# Patient Record
Sex: MALE | Race: WHITE | NOT HISPANIC OR LATINO | URBAN - METROPOLITAN AREA
[De-identification: names, ages, dates, MRNs, and addresses within clinical notes are randomized per-mention and may not be internally consistent; named-entity substitution may affect disease eponyms.]

---

## 2018-03-11 ENCOUNTER — EMERGENCY (EMERGENCY)
Facility: HOSPITAL | Age: 83
LOS: 0 days | Discharge: HOME | End: 2018-03-11
Attending: EMERGENCY MEDICINE | Admitting: EMERGENCY MEDICINE
Payer: MEDICARE

## 2018-03-11 VITALS
OXYGEN SATURATION: 95 % | DIASTOLIC BLOOD PRESSURE: 79 MMHG | TEMPERATURE: 98 F | HEART RATE: 68 BPM | RESPIRATION RATE: 17 BRPM | SYSTOLIC BLOOD PRESSURE: 126 MMHG

## 2018-03-11 VITALS
DIASTOLIC BLOOD PRESSURE: 72 MMHG | RESPIRATION RATE: 18 BRPM | HEART RATE: 55 BPM | HEIGHT: 66 IN | SYSTOLIC BLOOD PRESSURE: 127 MMHG | WEIGHT: 169.98 LBS

## 2018-03-11 DIAGNOSIS — G89.29 OTHER CHRONIC PAIN: ICD-10-CM

## 2018-03-11 DIAGNOSIS — M54.9 DORSALGIA, UNSPECIFIED: ICD-10-CM
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
## 2018-03-11 DIAGNOSIS — Z91.81 HISTORY OF FALLING: ICD-10-CM

## 2018-03-11 DIAGNOSIS — F10.129 ALCOHOL ABUSE WITH INTOXICATION, UNSPECIFIED: ICD-10-CM

## 2018-03-11 LAB
ALBUMIN SERPL ELPH-MCNC: 3.6 G/DL — SIGNIFICANT CHANGE UP (ref 3.3–5)
ALP SERPL-CCNC: 96 U/L — SIGNIFICANT CHANGE UP (ref 40–120)
ALT FLD-CCNC: 28 U/L — SIGNIFICANT CHANGE UP (ref 12–78)
ANION GAP SERPL CALC-SCNC: 12 MMOL/L — SIGNIFICANT CHANGE UP (ref 5–17)
APAP SERPL-MCNC: 7 UG/ML — LOW (ref 10–30)
APTT BLD: 25.8 SEC — LOW (ref 27.5–37.4)
AST SERPL-CCNC: 34 U/L — SIGNIFICANT CHANGE UP (ref 15–37)
BASOPHILS # BLD AUTO: 0.05 K/UL — SIGNIFICANT CHANGE UP (ref 0–0.2)
BASOPHILS NFR BLD AUTO: 0.6 % — SIGNIFICANT CHANGE UP (ref 0–2)
BILIRUB SERPL-MCNC: 0.2 MG/DL — SIGNIFICANT CHANGE UP (ref 0.2–1.2)
BUN SERPL-MCNC: 33 MG/DL — HIGH (ref 7–23)
CALCIUM SERPL-MCNC: 8.3 MG/DL — LOW (ref 8.5–10.1)
CHLORIDE SERPL-SCNC: 108 MMOL/L — SIGNIFICANT CHANGE UP (ref 96–108)
CK SERPL-CCNC: 210 U/L — SIGNIFICANT CHANGE UP (ref 26–308)
CO2 SERPL-SCNC: 21 MMOL/L — LOW (ref 22–31)
CREAT SERPL-MCNC: 1.27 MG/DL — SIGNIFICANT CHANGE UP (ref 0.5–1.3)
EOSINOPHIL # BLD AUTO: 0.16 K/UL — SIGNIFICANT CHANGE UP (ref 0–0.5)
EOSINOPHIL NFR BLD AUTO: 1.8 % — SIGNIFICANT CHANGE UP (ref 0–6)
ETHANOL SERPL-MCNC: 412 MG/DL — HIGH (ref 0–10)
GLUCOSE SERPL-MCNC: 106 MG/DL — HIGH (ref 70–99)
HCT VFR BLD CALC: 39.5 % — SIGNIFICANT CHANGE UP (ref 39–50)
HGB BLD-MCNC: 13.2 G/DL — SIGNIFICANT CHANGE UP (ref 13–17)
IMM GRANULOCYTES NFR BLD AUTO: 0.3 % — SIGNIFICANT CHANGE UP (ref 0–1.5)
INR BLD: 0.84 RATIO — LOW (ref 0.88–1.16)
LYMPHOCYTES # BLD AUTO: 3.8 K/UL — HIGH (ref 1–3.3)
LYMPHOCYTES # BLD AUTO: 43.1 % — SIGNIFICANT CHANGE UP (ref 13–44)
MCHC RBC-ENTMCNC: 30.1 PG — SIGNIFICANT CHANGE UP (ref 27–34)
MCHC RBC-ENTMCNC: 33.4 GM/DL — SIGNIFICANT CHANGE UP (ref 32–36)
MCV RBC AUTO: 90 FL — SIGNIFICANT CHANGE UP (ref 80–100)
MONOCYTES # BLD AUTO: 0.64 K/UL — SIGNIFICANT CHANGE UP (ref 0–0.9)
MONOCYTES NFR BLD AUTO: 7.3 % — SIGNIFICANT CHANGE UP (ref 2–14)
NEUTROPHILS # BLD AUTO: 4.13 K/UL — SIGNIFICANT CHANGE UP (ref 1.8–7.4)
NEUTROPHILS NFR BLD AUTO: 46.9 % — SIGNIFICANT CHANGE UP (ref 43–77)
NRBC # BLD: 0 /100 WBCS — SIGNIFICANT CHANGE UP (ref 0–0)
PLATELET # BLD AUTO: 219 K/UL — SIGNIFICANT CHANGE UP (ref 150–400)
POTASSIUM SERPL-MCNC: 4 MMOL/L — SIGNIFICANT CHANGE UP (ref 3.5–5.3)
POTASSIUM SERPL-SCNC: 4 MMOL/L — SIGNIFICANT CHANGE UP (ref 3.5–5.3)
PROT SERPL-MCNC: 6.6 GM/DL — SIGNIFICANT CHANGE UP (ref 6–8.3)
PROTHROM AB SERPL-ACNC: 9 SEC — LOW (ref 9.8–12.7)
RBC # BLD: 4.39 M/UL — SIGNIFICANT CHANGE UP (ref 4.2–5.8)
RBC # FLD: 13.4 % — SIGNIFICANT CHANGE UP (ref 10.3–14.5)
SALICYLATES SERPL-MCNC: <1.7 MG/DL — LOW (ref 2.8–20)
SODIUM SERPL-SCNC: 141 MMOL/L — SIGNIFICANT CHANGE UP (ref 135–145)
TROPONIN I SERPL-MCNC: 0.02 NG/ML — SIGNIFICANT CHANGE UP (ref 0.01–0.04)
WBC # BLD: 8.81 K/UL — SIGNIFICANT CHANGE UP (ref 3.8–10.5)
WBC # FLD AUTO: 8.81 K/UL — SIGNIFICANT CHANGE UP (ref 3.8–10.5)

## 2018-03-11 PROCEDURE — 99053 MED SERV 10PM-8AM 24 HR FAC: CPT

## 2018-03-11 PROCEDURE — 99285 EMERGENCY DEPT VISIT HI MDM: CPT | Mod: 25

## 2018-03-11 PROCEDURE — 93010 ELECTROCARDIOGRAM REPORT: CPT

## 2018-03-11 PROCEDURE — 70450 CT HEAD/BRAIN W/O DYE: CPT | Mod: 26

## 2018-03-11 PROCEDURE — 72125 CT NECK SPINE W/O DYE: CPT | Mod: 26

## 2018-03-11 RX ORDER — SODIUM CHLORIDE 9 MG/ML
3 INJECTION INTRAMUSCULAR; INTRAVENOUS; SUBCUTANEOUS ONCE
Qty: 0 | Refills: 0 | Status: COMPLETED | OUTPATIENT
Start: 2018-03-11 | End: 2018-03-11

## 2018-03-11 RX ADMIN — SODIUM CHLORIDE 3 MILLILITER(S): 9 INJECTION INTRAMUSCULAR; INTRAVENOUS; SUBCUTANEOUS at 04:09

## 2018-03-11 NOTE — ED ADULT TRIAGE NOTE - CHIEF COMPLAINT QUOTE
ETOH, wife states pt fell out of bed, denies injuries. Left wrist skin tear. trauma alert called to ED by Dr. Pompa

## 2018-03-11 NOTE — SBIRT NOTE. - NSSBIRTSERVICES_GEN_A_ED_FT
Provided SBIRT services: Full screen positive. Referral to Treatment Performed. Screening results were reviewed with the patient and patient was provided information about healthy guidelines and potential negative consequences associated with level of risk. Motivation and readiness to reduce or stop use was discussed and goals and activities to make changes were suggested/offered.    Referral for complete assessment and level of care determination at a certified treatment facility was completed by giving the patient information for treatment facilities that met their needs and encouraging them to call for an appointment. A call was not made to a facility because:    Treatment provider unavailable to complete phone intake/Sunday    Audit Score: 19  DAST Score: 0

## 2018-03-11 NOTE — ED PROVIDER NOTE - OBJECTIVE STATEMENT
81 y/o male in ED s/p fall out of bed x 1 hr.  as per family, pt has been drinking tonight.  states h/o chronic back pain and drinks to relieve pain.  family states heard noise and found pt on floor next to bed.  pt with no complaints.

## 2018-03-11 NOTE — ED ADULT NURSE NOTE - OBJECTIVE STATEMENT
82 year old male presenting to the ED via EMS with wife for fall with alcohol intoxication. Patient was called a trauma alert 0352, please see trauma alert flow sheet.

## 2018-03-11 NOTE — ED ADULT NURSE REASSESSMENT NOTE - NS ED NURSE REASSESS COMMENT FT1
0440: Trauma alert completed as per MD Mirza. Patient remains agitated, confused, with significant motor agitation. VSS. 1:1 Co remains in place for safety and fall monitoring. Wife at bedside. patient is awaiting further MD reassessment. Will continue to monitor/reassess.   0600: Patient remains agitated, no change in condition at this time. Bed padded with pillows/blankets for additional fall precaution/protection. Wife and 1:1 at bedside, providing patient with frequent re-orientation and reassurance. Will continue to monitor/reassess. 0400: 1:1 started for safety due to patient's agitation.   0440: Trauma alert completed as per MD Mirza. Patient remains agitated, confused, with significant motor agitation. VSS. 1:1 Co remains in place for safety and fall monitoring. Wife at bedside. patient is awaiting further MD reassessment. Will continue to monitor/reassess.   0600: Patient remains agitated, no change in condition at this time. Bed padded with pillows/blankets for additional fall precaution/protection. Wife and 1:1 at bedside, providing patient with frequent re-orientation and reassurance. Will continue to monitor/reassess. 0400: 1:1 started for safety due to patient's agitation and risk of fall.   0440: Trauma alert completed as per MD Mirza. Patient remains agitated, confused, with significant motor agitation. VSS. 1:1 Co remains in place for safety and fall monitoring. Wife at bedside. patient is awaiting further MD reassessment. Will continue to monitor/reassess.   0600: Patient remains agitated, no change in condition at this time. Bed padded with pillows/blankets for additional fall precaution/protection. Wife and 1:1 at bedside, providing patient with frequent re-orientation and reassurance. Will continue to monitor/reassess.  0630: RN Jens and MD Mirza provided wife with lab/radiology results and reviewed them with her. Remained at bedside to answer questions and update her on the patient's status and the plan of care. Patient sleeping at this time, with intermittent episodes of waking/agitation. 1:1 maintained. Will continue to monitor/reassess.

## 2018-03-15 ENCOUNTER — HOSPITAL ENCOUNTER (EMERGENCY)
Facility: HOSPITAL | Age: 82
Discharge: HOME | End: 2018-03-15
Attending: EMERGENCY MEDICINE
Payer: COMMERCIAL

## 2018-03-15 VITALS
RESPIRATION RATE: 22 BRPM | WEIGHT: 165 LBS | BODY MASS INDEX: 27.49 KG/M2 | HEART RATE: 88 BPM | DIASTOLIC BLOOD PRESSURE: 102 MMHG | TEMPERATURE: 99.1 F | HEIGHT: 65 IN | OXYGEN SATURATION: 95 % | SYSTOLIC BLOOD PRESSURE: 189 MMHG

## 2018-03-15 DIAGNOSIS — F10.20 ALCOHOLISM (CMS/HCC): ICD-10-CM

## 2018-03-15 DIAGNOSIS — F10.930 ALCOHOL WITHDRAWAL SYNDROME WITHOUT COMPLICATION (CMS/HCC): Primary | ICD-10-CM

## 2018-03-15 LAB
ALBUMIN SERPL-MCNC: 4.3 G/DL (ref 3.4–5)
ALP SERPL-CCNC: 60 IU/L (ref 35–126)
ALT SERPL-CCNC: 35 IU/L (ref 16–63)
ANION GAP SERPL CALC-SCNC: 8 MEQ/L (ref 3–15)
APAP SERPL-MCNC: <10 UG/ML (ref 10–30)
AST SERPL-CCNC: 43 IU/L (ref 15–41)
BILIRUB DIRECT SERPL-MCNC: 0.1 MG/DL
BILIRUB SERPL-MCNC: 0.8 MG/DL (ref 0.3–1.2)
BUN SERPL-MCNC: 12 MG/DL (ref 8–20)
CALCIUM SERPL-MCNC: 9.3 MG/DL (ref 8.9–10.3)
CHLORIDE SERPL-SCNC: 108 MMOL/L (ref 98–109)
CO2 SERPL-SCNC: 23 MMOL/L (ref 22–32)
CREAT SERPL-MCNC: 0.7 MG/DL (ref 0.8–1.3)
ERYTHROCYTE [DISTWIDTH] IN BLOOD BY AUTOMATED COUNT: 13.7 % (ref 11.6–14.4)
ETHANOL SERPL-MCNC: <5 MG/DL
GFR SERPL CREATININE-BSD FRML MDRD: >60 ML/MIN/1.73M*2
GLUCOSE SERPL-MCNC: 108 MG/DL (ref 70–99)
HCT VFR BLDCO AUTO: 37.8 % (ref 40–51)
HGB BLD-MCNC: 12.9 G/DL (ref 13.7–17.5)
MCH RBC QN AUTO: 30.6 PG (ref 28–33.2)
MCHC RBC AUTO-ENTMCNC: 34.1 G/DL (ref 32.2–36.5)
MCV RBC AUTO: 89.8 FL (ref 83–98)
PDW BLD AUTO: 9.6 FL (ref 9.4–12.4)
PLATELET # BLD AUTO: 225 K/UL (ref 150–350)
POTASSIUM SERPL-SCNC: 3.8 MMOL/L (ref 3.6–5.1)
PROT SERPL-MCNC: 7.2 G/DL (ref 6–8.2)
RBC # BLD AUTO: 4.21 M/UL (ref 4.5–5.8)
SALICYLATES SERPL-MCNC: <4 MG/DL
SODIUM SERPL-SCNC: 139 MMOL/L (ref 136–144)
WBC # BLD AUTO: 5.84 K/UL (ref 3.8–10.5)

## 2018-03-15 PROCEDURE — 25800000 HC PHARMACY IV SOLUTIONS: Performed by: EMERGENCY MEDICINE

## 2018-03-15 PROCEDURE — 36415 COLL VENOUS BLD VENIPUNCTURE: CPT | Performed by: EMERGENCY MEDICINE

## 2018-03-15 PROCEDURE — 99285 EMERGENCY DEPT VISIT HI MDM: CPT

## 2018-03-15 PROCEDURE — 82248 BILIRUBIN DIRECT: CPT | Performed by: EMERGENCY MEDICINE

## 2018-03-15 PROCEDURE — 85027 COMPLETE CBC AUTOMATED: CPT | Performed by: EMERGENCY MEDICINE

## 2018-03-15 PROCEDURE — 3E033GC INTRODUCTION OF OTHER THERAPEUTIC SUBSTANCE INTO PERIPHERAL VEIN, PERCUTANEOUS APPROACH: ICD-10-PCS | Performed by: EMERGENCY MEDICINE

## 2018-03-15 PROCEDURE — G0480 DRUG TEST DEF 1-7 CLASSES: HCPCS | Performed by: EMERGENCY MEDICINE

## 2018-03-15 PROCEDURE — 96361 HYDRATE IV INFUSION ADD-ON: CPT

## 2018-03-15 PROCEDURE — 80053 COMPREHEN METABOLIC PANEL: CPT | Performed by: EMERGENCY MEDICINE

## 2018-03-15 PROCEDURE — 63600000 HC DRUGS/DETAIL CODE: Performed by: EMERGENCY MEDICINE

## 2018-03-15 PROCEDURE — 96374 THER/PROPH/DIAG INJ IV PUSH: CPT

## 2018-03-15 PROCEDURE — 93005 ELECTROCARDIOGRAM TRACING: CPT | Performed by: EMERGENCY MEDICINE

## 2018-03-15 PROCEDURE — 3E033NZ INTRODUCTION OF ANALGESICS, HYPNOTICS, SEDATIVES INTO PERIPHERAL VEIN, PERCUTANEOUS APPROACH: ICD-10-PCS | Performed by: EMERGENCY MEDICINE

## 2018-03-15 RX ORDER — LORAZEPAM 2 MG/ML
0.5 INJECTION INTRAMUSCULAR ONCE
Status: COMPLETED | OUTPATIENT
Start: 2018-03-15 | End: 2018-03-15

## 2018-03-15 RX ORDER — TADALAFIL 5 MG/1
5 TABLET ORAL DAILY PRN
COMMUNITY

## 2018-03-15 RX ORDER — LORAZEPAM 1 MG/1
1 TABLET ORAL EVERY 6 HOURS PRN
Qty: 12 TABLET | Refills: 0 | Status: SHIPPED | OUTPATIENT
Start: 2018-03-15 | End: 2018-04-14

## 2018-03-15 RX ORDER — FELODIPINE 5 MG/1
10 TABLET, EXTENDED RELEASE ORAL DAILY
COMMUNITY

## 2018-03-15 RX ORDER — SERTRALINE HYDROCHLORIDE 100 MG/1
200 TABLET, FILM COATED ORAL DAILY
COMMUNITY

## 2018-03-15 RX ORDER — TRAZODONE HYDROCHLORIDE 50 MG/1
25 TABLET ORAL NIGHTLY
COMMUNITY

## 2018-03-15 RX ORDER — SIMVASTATIN 20 MG/1
20 TABLET, FILM COATED ORAL NIGHTLY
COMMUNITY

## 2018-03-15 RX ORDER — NABUMETONE 500 MG/1
500 TABLET, FILM COATED ORAL 2 TIMES DAILY PRN
COMMUNITY

## 2018-03-15 RX ORDER — ALLOPURINOL 100 MG/1
200 TABLET ORAL DAILY
COMMUNITY

## 2018-03-15 RX ORDER — OMEPRAZOLE 20 MG/1
20 CAPSULE, DELAYED RELEASE ORAL
COMMUNITY

## 2018-03-15 RX ORDER — LOSARTAN POTASSIUM 100 MG/1
100 TABLET ORAL DAILY
COMMUNITY

## 2018-03-15 RX ORDER — TERAZOSIN 2 MG/1
2 CAPSULE ORAL NIGHTLY
COMMUNITY

## 2018-03-15 RX ADMIN — LORAZEPAM 0.5 MG: 2 INJECTION INTRAMUSCULAR; INTRAVENOUS at 20:27

## 2018-03-15 RX ADMIN — SODIUM CHLORIDE 500 ML: 9 INJECTION, SOLUTION INTRAVENOUS at 20:24

## 2018-03-15 ASSESSMENT — ENCOUNTER SYMPTOMS
COUGH: 1
TROUBLE SWALLOWING: 0
SHORTNESS OF BREATH: 0
BLOOD IN STOOL: 0
DIARRHEA: 0
ABDOMINAL PAIN: 0
WEAKNESS: 0
NUMBNESS: 0
FEVER: 1
VOMITING: 0
TREMORS: 1

## 2018-03-15 NOTE — ED PROVIDER NOTES
HPI     Chief Complaint   Patient presents with   • Drug / Alcohol Assessment     (pt reports that he was admitted to a hospital in [place] on sat-sun for ETOH +400 level, was attempting to wean down his amount that he was using x 3 days, reports feeling very shaking and not himself, 1/5 a week however does binge, has been drinking to sleep c/o back pain, +depression, approx had 2oz today liquour)       82-year-old with chronic alcoholism with intermittent binge drinking episodes followed by detox symptoms presented with desire for supervised detox from alcohol with last binge drinking 5 days ago.  After drinking half a gallon of hard liquor 5 days ago, patient fell out of bed and was seen and hospitalized overnight in [facility] with an alcohol level over 400.  Patient was advised to self detox with gradual decreasing amount of hard liquor each day with current daily consumption at 2 ounces.  Has had intermittent tremors and anxiety.  After talking to his CPAP technician today, patient was directed to [facility] department for supervised detox.        Alcohol Problem   Severity:  Severe  Onset quality:  Gradual  Timing:  Intermittent  Progression:  Worsening  Chronicity:  Recurrent  Associated symptoms: cough and fever (subjective)    Associated symptoms: no abdominal pain, no chest pain, no diarrhea, no shortness of breath and no vomiting         Patient History     Past Medical History:   Diagnosis Date   • Alcohol abuse    • Anxiety    • Depression    • Detached retina    • GERD (gastroesophageal reflux disease)    • Gout    • Hypertension    • Lipid disorder    • Seborrheic keratoses    • Sleep apnea        Past Surgical History:   Procedure Laterality Date   • CATARACT EXTRACTION     • WRIST SURGERY         History reviewed. No pertinent family history.    Social History   Substance Use Topics   • Smoking status: Light Tobacco Smoker     Types: Pipe   • Smokeless tobacco: Never Used   •  "Alcohol use Yes       Systems Reviewed from Nursing Triage:  Tobacco  Allergies  Meds  Problems  Med Hx  Surg Hx  Soc Hx         Review of Systems     Review of Systems   Constitutional: Positive for fever (subjective).   HENT: Negative for trouble swallowing.    Eyes: Negative for visual disturbance.   Respiratory: Positive for cough. Negative for shortness of breath.    Cardiovascular: Negative for chest pain.   Gastrointestinal: Negative for abdominal pain, blood in stool, diarrhea and vomiting.   Neurological: Positive for tremors. Negative for weakness and numbness.   All other systems reviewed and are negative.       Physical Exam     ED Triage Vitals [03/15/18 1600]   Temp Heart Rate Resp BP SpO2   37.3 °C (99.1 °F) 97 18 140/82 97 %      Temp Source Heart Rate Source Patient Position BP Location FiO2 (%) (Set)   Tympanic -- -- -- --       Pulse Ox %: 95 % (03/15/18 2025)  Pulse Ox Interpretation: Normal (03/15/18 2025)  Heart Rate: 62 (03/15/18 2025)  Rhythm Strip Interpretation: Normal Sinus Rhythm (03/15/18 2025)    Patient Vitals for the past 24 hrs:   BP Temp Temp src Pulse Resp SpO2 Height Weight   03/15/18 1912 (!) 189/102 - - 88 (!) 22 95 % - -   03/15/18 1600 140/82 37.3 °C (99.1 °F) Tympanic 97 18 97 % 1.651 m (5' 5\") 74.8 kg (165 lb)           Physical Exam   Constitutional: He is oriented to person, place, and time. He appears well-developed and well-nourished.   HENT:   Head: Normocephalic and atraumatic.   Mouth/Throat: Oropharynx is clear and moist.   Eyes: Conjunctivae and EOM are normal. Pupils are equal, round, and reactive to light.   Neck: Normal range of motion. Neck supple.   Cardiovascular: Normal rate, regular rhythm, normal heart sounds and intact distal pulses.    Pulmonary/Chest: Effort normal and breath sounds normal.   Abdominal: Soft. Bowel sounds are normal. He exhibits distension (Mild) and mass (Reducible 3 cm ventral hernia nontender). There is no tenderness. "   Neurological: He is alert and oriented to person, place, and time. No sensory deficit. He exhibits normal muscle tone.   No tremors no asterixis   Skin: Skin is warm and dry. Capillary refill takes less than 2 seconds.   Psychiatric: He has a normal mood and affect.            Procedures    ED Course & MDM     Labs Reviewed   CBC - Abnormal        Result Value    RBC 4.21 (*)     Hemoglobin 12.9 (*)     Hematocrit 37.8 (*)     WBC 5.84      MCV 89.8      MCH 30.6      MCHC 34.1      RDW 13.7      Platelets 225      MPV 9.6     HEPATIC FUNCTION PANEL - Abnormal     AST (SGOT) 43 (*)     Albumin 4.3      Bilirubin, Total 0.8      Bilirubin, Direct 0.1      Alkaline Phosphatase 60      ALT (SGPT) 35      Total Protein 7.2     BASIC METABOLIC PANEL - Abnormal     Creatinine 0.7 (*)     Glucose 108 (*)     Sodium 139      Potassium 3.8      Chloride 108      CO2 23      BUN 12      Calcium 9.3      eGFR >60.0      Anion Gap 8     ER TOXICOLOGY SCR, SERUM - Abnormal     Acetaminophen Level <10.0 (*)     Salicylate Lvl <4.0      Ethanol Lvl <5     DRUG SCREEN PANEL, URINE       ECG 12 lead   ED Interpretation   Normal sinus rhythm @ 62   Normal axis and intervals   No ST elevation or depression   Unremarkable T-waves   No previous for comparison                 [facility]         ED Course as of Mar 16 0036   Fri Mar 16, 2018   0035 Results were relayed to patient reassured patient's progress of withdrawal.  Ativan prescription given for unlikely worsening of alcohol withdrawal symptoms.  Offer rehab evaluation and placement but patient declined.          Clinical Impressions as of Mar 16 0036   Alcohol withdrawal syndrome without complication (CMS/HCC) ([facility])   Alcoholism (CMS/HCC) ([facility])     Disposition:  Discharge     Jimi Hyde MD  03/16/18 0036     DISPLAY PLAN FREE TEXT

## 2018-03-16 LAB
ATRIAL RATE: 62
P AXIS: 43
PR INTERVAL: 134
QRS DURATION: 76
QT INTERVAL: 434
QTC CALCULATION(BAZETT): 440
R AXIS: -13
T WAVE AXIS: 20
VENTRICULAR RATE: 62

## 2022-03-04 NOTE — ED PROVIDER NOTE - PROGRESS NOTE DETAILS
CM notes discharge order chart reviewed. Pt admitted in Observation status with constipation DX. Met with pt at bedside and pt has insurance, PCP and plans to discharge back home with current support system in place. Denies any DCP needs. If any needs or concerns should arise please advise.  Rolly Cifuentes RN Attending Juliano: On reeval pt has no acute complaints, mild low back pain that is chronic. Awake and alert, partner at bedside reports he is at baseline. Pt observed ambulating w/o assistance in ED w/o issue. No acute signs of intox or w/d. Pt and partner understand return precautions and risks of etoh w/d. Will fu closely with his psychiatrist in [place]

## 2024-07-26 ENCOUNTER — TELEPHONE (OUTPATIENT)
Dept: GASTROENTEROLOGY | Facility: CLINIC | Age: 89
End: 2024-07-26

## 2024-07-26 NOTE — TELEPHONE ENCOUNTER
Admitted to [facility] with gastroenteritis abnormal CT showing duodenitis.  EGD showed DU.  Being discharged today.  Please set up for outpatient visit in 4 weeks with Dr. Johnson

## 2024-08-26 ENCOUNTER — OFFICE VISIT (OUTPATIENT)
Age: 89
End: 2024-08-26
Payer: COMMERCIAL

## 2024-08-26 VITALS
WEIGHT: 155 LBS | HEIGHT: 64 IN | SYSTOLIC BLOOD PRESSURE: 118 MMHG | BODY MASS INDEX: 26.46 KG/M2 | DIASTOLIC BLOOD PRESSURE: 72 MMHG

## 2024-08-26 DIAGNOSIS — D50.8 OTHER IRON DEFICIENCY ANEMIA: ICD-10-CM

## 2024-08-26 DIAGNOSIS — K21.9 GASTROESOPHAGEAL REFLUX DISEASE WITHOUT ESOPHAGITIS: Primary | ICD-10-CM

## 2024-08-26 DIAGNOSIS — K29.80 DUODENITIS: ICD-10-CM

## 2024-08-26 PROCEDURE — 99214 OFFICE O/P EST MOD 30 MIN: CPT | Performed by: NURSE PRACTITIONER

## 2024-08-26 RX ORDER — PANTOPRAZOLE SODIUM 40 MG/1
40 TABLET, DELAYED RELEASE ORAL
Qty: 30 TABLET | Refills: 5 | Status: SHIPPED | OUTPATIENT
Start: 2024-08-26

## 2024-08-26 RX ORDER — SIMVASTATIN 20 MG
20 TABLET ORAL DAILY
COMMUNITY
Start: 2024-08-02

## 2024-08-26 RX ORDER — PANTOPRAZOLE SODIUM 40 MG/1
40 TABLET, DELAYED RELEASE ORAL 2 TIMES DAILY
COMMUNITY
Start: 2024-07-26 | End: 2024-08-26

## 2024-08-26 RX ORDER — TERAZOSIN 2 MG/1
2 CAPSULE ORAL
COMMUNITY

## 2024-08-26 RX ORDER — LOSARTAN POTASSIUM 100 MG/1
TABLET ORAL
COMMUNITY
Start: 2024-08-23

## 2024-08-26 RX ORDER — ESCITALOPRAM OXALATE 10 MG/1
10 TABLET ORAL DAILY
COMMUNITY
Start: 2024-07-05

## 2024-08-26 RX ORDER — FELODIPINE 10 MG/1
10 TABLET, EXTENDED RELEASE ORAL DAILY
COMMUNITY
Start: 2024-07-29

## 2024-08-26 RX ORDER — FAMOTIDINE 40 MG/1
40 TABLET, FILM COATED ORAL DAILY
COMMUNITY
Start: 2024-08-19

## 2024-08-26 RX ORDER — POLYVINYL ALCOHOL 14 MG/ML
1 SOLUTION/ DROPS OPHTHALMIC AS NEEDED
COMMUNITY

## 2024-08-26 RX ORDER — ALLOPURINOL 100 MG/1
200 TABLET ORAL DAILY
COMMUNITY
Start: 2024-06-05

## 2024-08-26 RX ORDER — TRAZODONE HYDROCHLORIDE 100 MG/1
100 TABLET ORAL
COMMUNITY

## 2024-08-26 RX ORDER — SIMETHICONE 40MG/0.6ML
SUSPENSION, DROPS(FINAL DOSAGE FORM)(ML) ORAL
COMMUNITY
Start: 2024-08-08

## 2024-08-26 RX ORDER — CLONAZEPAM 0.5 MG/1
TABLET ORAL
COMMUNITY
Start: 2024-07-31

## 2024-08-26 RX ORDER — ACETAMINOPHEN 325 MG/1
650 TABLET ORAL EVERY 6 HOURS PRN
COMMUNITY

## 2024-08-26 NOTE — PROGRESS NOTES
[facility] - Outpatient Follow-up Note  Joni Ryan 88 y.o. male MRN: [id number]  Encounter: [id number]    ASSESSMENT AND PLAN:    1.  GERD  2.  Duodenal ulcers  3.  Anemia  Recent [facility] admission due to nausea/vomiting/diarrhea.  Found to be anemic with hemoglobin 10.4  EGD 7/25/2024 showed multiple clean-based duodenal ulcers.  Biopsy negative for H. pylori or malignancy  Patient denies GERD symptoms, still with mild nausea  No melena, no alarm symptoms  -Continue pantoprazole 40 mg daily  -Continue famotidine 40 mg at bedtime  -Recommend alcohol cessation  -Discussed GERD diet and lifestyle modifications  -Will check CBC and iron panel to further characterize anemia  -No EGD recall unless clinically indicated    4.  History of alcohol abuse  Currently admits to 2 drinks 3 times per week however recent syncope/fall due to intoxication  -Discussed abstinence from EtOH.  Patient is agreeable to trying    Followup Appointment: 2 to 3 months  ______________________________________________________________________    Chief Complaint   Patient presents with   • Hospital Follow-up     Pt states diarrhea and vomiting have resolved, occasional nausea in the morning.  He would like to review EGD results.      HPI: Presents in follow-up after recent [facility] hospitalization in late July 2024 with nausea, vomiting and diarrhea.  Taking daily ibuprofen for back pain  CT scan abdomen/pelvis suspicious for duodenitis.  Noted to be anemic with hemoglobin 10.9  He underwent EGD 7/25/2024 and was noted to have multiple clean-based duodenal ulcers  Treated with PPI    Feeling well since discharge  Denies dysphagia, no reflux symptoms.  Continues to experience intermittent nausea but no vomiting.  He reports his appetite is good and his weight has been stable  Continues to drink 2 alcoholic drinks 3 times per week  No longer taking ibuprofen-takes 2 Tylenol daily for back pain  He takes his own  "medication but is unsure of current PPI dosing  Was discharged on twice daily dosing, saw PCP in early August and decreased to daily  Famotidine 40 mg added due to persistent nausea    Reports regular brown stool daily.  Denies melena or rectal bleeding  Outpatient CBC 8/2/2024 was stable at 10.9    Historical Information   History reviewed. No pertinent past medical history.  Past Surgical History:   Procedure Laterality Date   • COLONOSCOPY      Pt unsure of last screening, was told no longer needs to be screened   • UPPER GASTROINTESTINAL ENDOSCOPY  07/2024    [facility]     Social History     Substance and Sexual Activity   Alcohol Use Yes     Social History     Substance and Sexual Activity   Drug Use Never     Social History     Tobacco Use   Smoking Status Former   • Types: Cigarettes   • Passive exposure: Past   Smokeless Tobacco Never     Family History   Problem Relation Age of Onset   • No Known Problems Mother    • No Known Problems Father    • No Known Problems Maternal Grandmother    • No Known Problems Maternal Grandfather    • No Known Problems Paternal Grandmother    • No Known Problems Paternal Grandfather    • Colon cancer Neg Hx          Current Outpatient Medications:   •  acetaminophen (TYLENOL) 325 mg tablet  •  allopurinol (ZYLOPRIM) 100 mg tablet  •  clonazePAM (KlonoPIN) 0.5 mg tablet  •  escitalopram (LEXAPRO) 10 mg tablet  •  famotidine (PEPCID) 40 MG tablet  •  felodipine (PLENDIL) 10 MG 24 hr tablet  •  losartan (COZAAR) 100 MG tablet  •  Mylanta Coat & Cool 1200-270-80 MG/10ML SUSP  •  pantoprazole (PROTONIX) 40 mg tablet  •  polyvinyl alcohol (LIQUIFILM TEARS) 1.4 % ophthalmic solution  •  simvastatin (ZOCOR) 20 mg tablet  •  terazosin (HYTRIN) 2 mg capsule  •  traZODone (DESYREL) 100 mg tablet  Allergies   Allergen Reactions   • Naproxen Itching   • Oxycodone Itching     Reviewed medications and allergies and updated as indicated    PHYSICAL EXAM:    Blood pressure 118/72, height 5' 4\" " (1.626 m), weight 70.3 kg (155 lb). Body mass index is 26.61 kg/m².  General Appearance: NAD, cooperative, alert  Eyes: Anicteric  ENT:  Normocephalic, atraumatic, normal mucosa.    Neck:  Supple, symmetrical, trachea midline  Resp:  Clear to auscultation bilaterally; no rales, rhonchi or wheezing; respirations unlabored   CV:  S1 S2, Regular rate and rhythm; no murmur, rub, or gallop.  GI:  Soft, non-tender, non-distended; normal bowel sounds; no masses, no organomegaly   Rectal: Deferred  Musculoskeletal: No cyanosis, clubbing or edema. Normal ROM.  Skin:  No jaundice, rashes, or lesions   Psych: Normal affect, good eye contact  Neuro: No gross deficits, AAOx3      Lab Results   Component Value Date    K 3.9 02/24/2023     02/24/2023    CO2 24 02/24/2023    BUN 17 02/24/2023    CREATININE 0.90 02/24/2023    CALCIUM 9.0 02/24/2023    AST 19 02/24/2023    ALT 19 02/24/2023    ALKPHOS 52 02/24/2023    EGFR 83 02/24/2023     Lab Results   Component Value Date    IRON 75 02/24/2023    FERRITIN 73.1 02/24/2023     .

## 2024-08-26 NOTE — PATIENT INSTRUCTIONS
Take pantoprazole 40 mg 30 minutes prior to breakfast/lunch  Take famotidine 40 mg at bedtime-does not need to be taken with food  Lab work-slips provided  Avoid alcohol

## 2024-09-18 DIAGNOSIS — K29.80 DUODENITIS: ICD-10-CM

## 2024-09-18 DIAGNOSIS — K21.9 GASTROESOPHAGEAL REFLUX DISEASE WITHOUT ESOPHAGITIS: ICD-10-CM

## 2024-09-18 RX ORDER — PANTOPRAZOLE SODIUM 40 MG/1
40 TABLET, DELAYED RELEASE ORAL
Qty: 90 TABLET | Refills: 1 | Status: SHIPPED | OUTPATIENT
Start: 2024-09-18